# Patient Record
Sex: FEMALE | NOT HISPANIC OR LATINO
[De-identification: names, ages, dates, MRNs, and addresses within clinical notes are randomized per-mention and may not be internally consistent; named-entity substitution may affect disease eponyms.]

---

## 2023-06-07 PROBLEM — Z00.00 ENCOUNTER FOR PREVENTIVE HEALTH EXAMINATION: Status: ACTIVE | Noted: 2023-06-07

## 2023-08-08 ENCOUNTER — NON-APPOINTMENT (OUTPATIENT)
Age: 68
End: 2023-08-08

## 2023-08-08 ENCOUNTER — APPOINTMENT (OUTPATIENT)
Dept: GASTROENTEROLOGY | Facility: CLINIC | Age: 68
End: 2023-08-08
Payer: MEDICARE

## 2023-08-08 VITALS
HEIGHT: 64 IN | SYSTOLIC BLOOD PRESSURE: 134 MMHG | RESPIRATION RATE: 16 BRPM | DIASTOLIC BLOOD PRESSURE: 73 MMHG | BODY MASS INDEX: 25.61 KG/M2 | HEART RATE: 62 BPM | OXYGEN SATURATION: 100 % | WEIGHT: 150 LBS

## 2023-08-08 DIAGNOSIS — F41.9 ANXIETY DISORDER, UNSPECIFIED: ICD-10-CM

## 2023-08-08 DIAGNOSIS — Z12.11 ENCOUNTER FOR SCREENING FOR MALIGNANT NEOPLASM OF COLON: ICD-10-CM

## 2023-08-08 PROCEDURE — 99203 OFFICE O/P NEW LOW 30 MIN: CPT

## 2023-08-08 RX ORDER — SODIUM SULFATE, POTASSIUM SULFATE AND MAGNESIUM SULFATE 1.6; 3.13; 17.5 G/177ML; G/177ML; G/177ML
17.5-3.13-1.6 SOLUTION ORAL
Qty: 1 | Refills: 0 | Status: ACTIVE | COMMUNITY
Start: 2023-08-08 | End: 1900-01-01

## 2023-08-08 RX ORDER — CITALOPRAM 10 MG/1
TABLET, FILM COATED ORAL
Refills: 0 | Status: ACTIVE | COMMUNITY

## 2023-08-08 RX ORDER — PRAVASTATIN SODIUM 10 MG/1
10 TABLET ORAL
Refills: 0 | Status: ACTIVE | COMMUNITY

## 2023-08-08 NOTE — HISTORY OF PRESENT ILLNESS
[FreeTextEntry1] : 69 yo F  hx HLD, anxiety, here for screening colonoscopy   Last colonoscopy:; 15 years ago, normal per pt.  Soc: no tobacco or significant EtOH  Family Hx: no significant GI family history including colon cancer, stomach cancer, IBD, celiac  ROS: constitutional: no weight loss, fevers ENT: no deafness Eyes: no blindness Neck: no lymphadenopathy Chest: no shortness of breath, no cough Cardiac: no chest pain, no palpitations Vascular: no leg swelling GI: no abdominal pain, nausea, vomiting, diarrhea, constipation, rectal bleeding, melena, dysphagia,  unless otherwise noted in HPI : no dysuria, dark urine Skin: no rashes, lesions, jaundice Heme: no bleeding Endocrine: no DM  unless otherwise stated in HPI  Physical Exam: (VS noted below) General: alert, comfortable, in no acute distress Eyes: normal sclera, anicteric Neck: normal, supple, no neck mass Pulm: no respiratory distress, clear to auscultation bilaterally  Heart: RRR, normal S1 S2 Abd: Soft, non-tender, non-distended, normal bowel sounds, no appreciable hepatosplenomegaly, no masses palpated Rectal: deferred Ext: warm and well perfused, no edema Skin: no rashes, no juandice Neuro: alert, grossly nonfocal  Labs/imaging/prior endoscopic results were reviewed to the extent available and pertinent findings noted in HPI

## 2023-08-08 NOTE — ASSESSMENT
[FreeTextEntry1] : 69 yo F here for screening colonoscopy   Will plan on a colonoscopy for screening..  Explained the risks (including but not limited to cardiopulmonary / anesthesia complications, bleeding, infection, perforation, aspiration, missed lesions, internal organ injury), benefits, and alternatives. Preparation for the procedure was reviewed with the patient. The patient was informed that she/he would be given IV anesthesia by an anesthesiologist during the procedure. The patient was informed that a family member or friend must drive the patient following recovery from the procedure. The patient understands and agrees, all questions answered. Will use a  suprep bowel prep with clears the day prior.

## 2023-08-08 NOTE — CONSULT LETTER
[Dear  ___] : Dear  [unfilled], [Consult Letter:] : I had the pleasure of evaluating your patient, [unfilled]. [Please see my note below.] : Please see my note below. [Consult Closing:] : Thank you very much for allowing me to participate in the care of this patient.  If you have any questions, please do not hesitate to contact me. [Sincerely,] : Sincerely, [FreeTextEntry3] : Malathi Beebe M.D.

## 2023-08-24 ENCOUNTER — TRANSCRIPTION ENCOUNTER (OUTPATIENT)
Age: 68
End: 2023-08-24

## 2023-10-20 ENCOUNTER — APPOINTMENT (OUTPATIENT)
Dept: GASTROENTEROLOGY | Facility: HOSPITAL | Age: 68
End: 2023-10-20
Payer: MEDICARE

## 2023-10-20 PROCEDURE — 45378 DIAGNOSTIC COLONOSCOPY: CPT
